# Patient Record
Sex: MALE | Race: WHITE | Employment: FULL TIME | URBAN - METROPOLITAN AREA
[De-identification: names, ages, dates, MRNs, and addresses within clinical notes are randomized per-mention and may not be internally consistent; named-entity substitution may affect disease eponyms.]

---

## 2022-03-11 ENCOUNTER — HOSPITAL ENCOUNTER (OUTPATIENT)
Dept: WOUND CARE | Age: 62
Discharge: HOME | End: 2022-03-11

## 2022-03-11 VITALS
TEMPERATURE: 97.9 F | WEIGHT: 268 LBS | HEIGHT: 73 IN | DIASTOLIC BLOOD PRESSURE: 84 MMHG | HEART RATE: 61 BPM | SYSTOLIC BLOOD PRESSURE: 129 MMHG | BODY MASS INDEX: 35.52 KG/M2 | RESPIRATION RATE: 16 BRPM

## 2022-03-11 RX ORDER — EZETIMIBE 10 MG/1
10 TABLET ORAL DAILY
COMMUNITY

## 2022-03-11 RX ORDER — PRAVASTATIN SODIUM 80 MG/1
80 TABLET ORAL
COMMUNITY

## 2022-03-11 RX ORDER — DOXYCYCLINE 100 MG/1
100 CAPSULE ORAL 2 TIMES DAILY
COMMUNITY
End: 2022-03-23

## 2022-03-11 RX ORDER — GUAIFENESIN 100 MG/5ML
81 LIQUID (ML) ORAL DAILY
COMMUNITY

## 2022-03-15 ENCOUNTER — HOSPITAL ENCOUNTER (OUTPATIENT)
Dept: WOUND CARE | Age: 62
Discharge: HOME | End: 2022-03-15
Payer: COMMERCIAL

## 2022-03-15 VITALS
RESPIRATION RATE: 16 BRPM | TEMPERATURE: 97.8 F | SYSTOLIC BLOOD PRESSURE: 121 MMHG | HEART RATE: 68 BPM | DIASTOLIC BLOOD PRESSURE: 78 MMHG

## 2022-03-15 DIAGNOSIS — L97.112 NON-PRESSURE CHRONIC ULCER OF RIGHT THIGH WITH FAT LAYER EXPOSED (HCC): Primary | ICD-10-CM

## 2022-03-15 PROCEDURE — 11042 DBRDMT SUBQ TIS 1ST 20SQCM/<: CPT

## 2022-03-15 PROCEDURE — 99213 OFFICE O/P EST LOW 20 MIN: CPT

## 2022-03-15 PROCEDURE — 99203 OFFICE O/P NEW LOW 30 MIN: CPT

## 2022-03-15 RX ORDER — LIDOCAINE 40 MG/G
CREAM TOPICAL ONCE
Status: CANCELLED | OUTPATIENT
Start: 2022-03-15 | End: 2022-03-15

## 2022-03-15 RX ORDER — LIDOCAINE 50 MG/G
OINTMENT TOPICAL ONCE
Status: CANCELLED | OUTPATIENT
Start: 2022-03-15 | End: 2022-03-15

## 2022-03-15 RX ORDER — TRIAMCINOLONE ACETONIDE 1 MG/G
OINTMENT TOPICAL ONCE
Status: CANCELLED | OUTPATIENT
Start: 2022-03-15 | End: 2022-03-15

## 2022-03-15 RX ORDER — LIDOCAINE HYDROCHLORIDE 40 MG/ML
SOLUTION TOPICAL ONCE
Status: CANCELLED | OUTPATIENT
Start: 2022-03-15 | End: 2022-03-15

## 2022-03-15 RX ORDER — MUPIROCIN 20 MG/G
OINTMENT TOPICAL ONCE
Status: CANCELLED | OUTPATIENT
Start: 2022-03-15 | End: 2022-03-15

## 2022-03-15 RX ORDER — LIDOCAINE HYDROCHLORIDE 20 MG/ML
JELLY TOPICAL ONCE
Status: CANCELLED | OUTPATIENT
Start: 2022-03-15 | End: 2022-03-15

## 2022-03-15 RX ORDER — CLOBETASOL PROPIONATE 0.5 MG/G
OINTMENT TOPICAL ONCE
Status: CANCELLED | OUTPATIENT
Start: 2022-03-15 | End: 2022-03-15

## 2022-03-15 RX ORDER — BACITRACIN 500 [USP'U]/G
OINTMENT TOPICAL ONCE
Status: CANCELLED | OUTPATIENT
Start: 2022-03-15 | End: 2022-03-15

## 2022-03-15 NOTE — WOUND CARE
03/15/22 1305   Wound Thigh Right;Lateral   Date First Assessed/Time First Assessed: 03/11/22 0829   Present on Hospital Admission: Yes  Wound Approximate Age at First Assessment (Weeks): 2 weeks  Location: Thigh  Wound Location Orientation: Right;Lateral   Wound Image    Wound Etiology Other (Comment)  (infection)   Dressing Status Intact;Breakthrough drainage noted   Cleansed Irrigated with saline   Wound Length (cm) 2.7 cm   Wound Width (cm) 2.7 cm   Wound Depth (cm) 0.5 cm   Wound Surface Area (cm^2) 7.29 cm^2   Change in Wound Size % 0   Wound Volume (cm^3) 3.645 cm^3   Wound Healing % 29   Wound Assessment Fibrin;Slough;Pink/red   Drainage Amount Moderate   Drainage Description Serosanguinous   Wound Odor None   Savanna-Wound/Incision Assessment Intact   Edges Attached edges   Wound Thickness Description Full thickness   Pain 1   Pain Scale 1 Numeric (0 - 10)   Pain Intensity 1 0     Visit Vitals  /78 (BP 1 Location: Left upper arm, BP Patient Position: At rest;Sitting)   Pulse 68   Temp 97.8 °F (36.6 °C)   Resp 16

## 2022-03-15 NOTE — PROGRESS NOTES
Past Medical History:   Diagnosis Date    Anxiety disorder     CAD S/P percutaneous coronary angioplasty KRISTINA to RCA mid    Depression     Gout     MONO (obstructive sleep apnea) s/p uvulectomy    Stroke [facility]) feb 2015       Past Surgical History:   Procedure Laterality Date    HX HEENT  uvulectomy for MONO    HX HEENT  march 2015    tumor removed from nose       Family History   Problem Relation Age of Onset   Tarun Stager Stroke Mother     Heart Disease Mother     Diabetes Mother     Obesity Mother     Cancer Father         inner ear    Cancer Sister         ovarian       Social History     Tobacco Use    Smoking status: Never Smoker    Smokeless tobacco: Never Used   Substance Use Topics    Alcohol use: No     Alcohol/week: 0.0 standard drinks    Drug use: No       No Known Allergies    Current Outpatient Medications on File Prior to Encounter   Medication Sig Dispense Refill    aspirin 81 mg chewable tablet Take 81 mg by mouth daily.  ezetimibe (ZETIA) 10 mg tablet Take 10 mg by mouth daily.  doxycycline (MONODOX) 100 mg capsule Take 100 mg by mouth two (2) times a day.  pravastatin (PRAVACHOL) 80 mg tablet Take 80 mg by mouth nightly.  multivitamin (ONE A DAY) tablet Take 1 Tab by mouth daily. No current facility-administered medications on file prior to encounter.        No components found for: LABA1C    Wound Thigh Right;Lateral (Active)   Wound Image   03/15/22 1305   Wound Etiology Other (Comment) 03/15/22 1305   Dressing Status Intact;Breakthrough drainage noted 03/15/22 1305   Cleansed Irrigated with saline 03/15/22 1305   Wound Length (cm) 2.7 cm 03/15/22 1305   Wound Width (cm) 2.7 cm 03/15/22 1305   Wound Depth (cm) 0.5 cm 03/15/22 1305   Wound Surface Area (cm^2) 7.29 cm^2 03/15/22 1305   Change in Wound Size % 0 03/15/22 1305   Wound Volume (cm^3) 3.645 cm^3 03/15/22 1305   Wound Healing % 29 03/15/22 1305   Wound Assessment Fibrin;Slough;Pink/red 03/15/22 1305 Drainage Amount Moderate 03/15/22 1305   Drainage Description Serosanguinous 03/15/22 1305   Wound Odor None 03/15/22 1305   Savanna-Wound/Incision Assessment Intact 03/15/22 1305   Edges Attached edges 03/15/22 1305   Wound Thickness Description Full thickness 03/15/22 1305   Number of days: 4       . Chief Complaint (CC): chronic non healing wound R thigh. Present Illness (PI): patient 3 weeks ago developed a boil R thigh it was opened in urgent care 3 days later, packed regularly and treated with PO antibiotics. Its better but not healing . Pertinent Past History (PMedHx): cardiac stent, frontal optic nerve tumor removed, TIA. Also noted:                         Medications and Allergies: as per [facility]. I have reviewed and concur. Illnesses: as per '[facility]' recorded data noted today. Surgeries and Injuries: as per '[address] [place] [address]' recorded data noted today. .    Review of Systems (ROS):                        Integumentary: Other than as noted in 'PI'; skin hair and nails normal for age, with no new rash, lumps, bumps, eruptions or bleeding. Lymph: no new prominent nodes or drainage near lymph nodes. Bones, Joints, and Muscles: Other than as noted in 'PI' no new fractures, dislocations, weakness or pain. .                        Hematopoietic: no new bleeding or bruising or anemia changes. .                        Eyes: no recent trauma or inflammation. has. Eye glasses. no. Intra Occular Lens Implants (IOLI)                        Ears: Hearing is unchanged and usually good. Nose: no new drainage, rhinorhea or epistaxis. Mouth, and throat: no soreness, drainage or lesions. no. Dentures. Neck: no new masses, drainage or pain                        Respiratory; no hemoptysis, current shortness of breath or pain with breath. Cardiovascular: No chest pain, palpitation or tachycardia. .                        Gastrointestinal: no recent change in appetite, stools or food tolerance. No jaundice, hematemesis, vomiting or diarrhea                        Genito-Urinary: urine color, frequency, sensation unchanged                        Neurologic: no syncope, dizzyness or unusual sensations. Psychologic and Mental Status: no change in mood, sleep or memory recently     Social History: '[employer]' data today is noted. Works as , non smoker. Family History: '[employer]' data today is noted. [facility] Physical Exam:      General:  alert, NAD high BMI. Head, Eyes, Ears, Nose and Throat: normocephalic, PERRLA EOMI, Tms mucosa benign. Neck: supple without masses or adenopathy. No mass. Chest: full excursion without deformity, . Lungs: rales L only. Heart: RSR no M. Abdomen: soft round non tender, . Neurology: Cranial nerves 2-12 grossly intact . Reflexes: BJ, CBJ, KJ, AJ 2+ and =.  Vascular:                         Pulses:                                            R                    L                                               Radial                        +.                 +.                                              Femoral                     +.                 +.                                              DP                             -. -.                                              PT                             -.                 -.  Extremities: supple non edema. Anterio-lateral R thigh with pale non blanching irregular surface, thin watery drainage and pink surround  Other: [facility] Vital signs and data recorded in '[employer]' for this patient today is noted, reviewed and considered. Patient notes today: no pain today. Procedure Note    PreOp diagnosis: abscess with necrotic surface. .  Name of Procedure: sharp excisional debridement. Anaesthesia: Lidocaine; topical   Description: using sharp scissors I excised all non viable tissue to effect a clean bleeding base. Tissue level of debridement: fat. Post debridement dimensions changed as noted:    Depth, add 1.0 mm;    Width, add 0.0 mm   Length, add 0.0 mm. Blood Loss: 3. CCs. Bleeding abated post treatment . Post Op Diagnosis, and condition: drained abscess probably now minimal bacteria. Follow Up Plan: RTC 2 week baby shampoo clean, aquacel and cover daily. Specimens: C&S. Counseled regarding/Discussed: as above. Clinical considerations: alert to infection, avoid trauma, soil. Prognosis: eval at 2 week, s. Dx Codes: O78.895.

## 2022-03-15 NOTE — DISCHARGE INSTRUCTIONS
Discharge Instructions/Wound Orders  [place] [address] [address] [facility], [address]  Telephone: [phone]    NAME:  Krystina Prakash  YOB: 1960  MEDICAL RECORD NUMBER:  [id number]  DATE:  3/15/2022  Wound Care Orders:  Right leg wound -  Cleanse with baby shampoo, let lather sit for 2-3 minutes, rinse off and pat dry. Place aquacel Ag to wound bed. Cover with silicone foam border. Change every  other day. Dietary:  [x] Diet as tolerated: [] No Added Salt:     [x] Increase Protein:     [] Other:Limit the amount of liquid you are drinking and avoid drinking in between meals   Activity:  [x] Activity as tolerated:  [] Patient has no activity restrictions     [] Strict Bedrest: [] Remain off Work:     [] May return to full duty work:                                   [] Return to work with restrictions:           Return Appointment:  [x] Return Appointment: With Dr Alexandro Alegria in 2 weeks. [] Ordered tests:    Electronically signed Fela Sheppard RN on 3/15/2022 at 1:32 PM     Cecilia Johnson 281: Should you experience any significant changes in your wound(s) or have questions about your wound care, please contact the [address] [place] [address] at [address] [place] [address] 8:00 am - 4:30. If you need help with your wound outside these hours and cannot wait until we are again available, contact your PCP or go to the hospital emergency room. PLEASE NOTE: IF YOU ARE UNABLE TO OBTAIN WOUND SUPPLIES, CONTINUE TO USE THE SUPPLIES YOU HAVE AVAILABLE UNTIL YOU ARE ABLE TO REACH US. IT IS MOST IMPORTANT TO KEEP THE WOUND COVERED AT ALL TIMES.      Physician Signature:_______________________    Date: ___________ Time:  ____________

## 2022-03-23 ENCOUNTER — HOSPITAL ENCOUNTER (OUTPATIENT)
Dept: INFUSION THERAPY | Age: 62
Discharge: HOME | End: 2022-03-23
Payer: COMMERCIAL

## 2022-03-23 VITALS
BODY MASS INDEX: 36.45 KG/M2 | TEMPERATURE: 98.1 F | WEIGHT: 275 LBS | HEIGHT: 73 IN | DIASTOLIC BLOOD PRESSURE: 74 MMHG | OXYGEN SATURATION: 96 % | RESPIRATION RATE: 18 BRPM | HEART RATE: 62 BPM | SYSTOLIC BLOOD PRESSURE: 116 MMHG

## 2022-03-23 LAB
ANION GAP SERPL CALC-SCNC: 8 MMOL/L (ref 5–15)
BUN SERPL-MCNC: 14 MG/DL (ref 6–20)
BUN/CREAT SERPL: 16 (ref 12–20)
CALCIUM SERPL-MCNC: 8.8 MG/DL (ref 8.5–10.1)
CHLORIDE SERPL-SCNC: 106 MMOL/L (ref 97–108)
CO2 SERPL-SCNC: 27 MMOL/L (ref 21–32)
CREAT SERPL-MCNC: 0.88 MG/DL (ref 0.7–1.3)
GLUCOSE SERPL-MCNC: 102 MG/DL (ref 65–100)
POTASSIUM SERPL-SCNC: 4.4 MMOL/L (ref 3.5–5.1)
SODIUM SERPL-SCNC: 141 MMOL/L (ref 136–145)

## 2022-03-23 PROCEDURE — 36415 COLL VENOUS BLD VENIPUNCTURE: CPT

## 2022-03-23 PROCEDURE — 74011000258 HC RX REV CODE- 258: Performed by: EMERGENCY MEDICINE

## 2022-03-23 PROCEDURE — 96365 THER/PROPH/DIAG IV INF INIT: CPT

## 2022-03-23 PROCEDURE — 80048 BASIC METABOLIC PNL TOTAL CA: CPT

## 2022-03-23 PROCEDURE — 74011250636 HC RX REV CODE- 250/636: Performed by: EMERGENCY MEDICINE

## 2022-03-23 RX ADMIN — DEXTROSE MONOHYDRATE 1500 MG: 50 INJECTION, SOLUTION INTRAVENOUS at 10:17

## 2022-03-23 NOTE — PROGRESS NOTES
\[facility]\"" Progress Note  Date: March 23, 2022       Treatment: [place]      Mr. Twyla Faria arrived ambulatory and in no acute distress at 0850 accompanied by wife. Patient COVID Screening completed:   Do you have any symptoms of COVID-19? SOB, coughing, fever, or generally not feeling well? NO   Have you been exposed to COVID-19 recently? NO   Have you had any recent contact with family/friend that has a pending COVID test? NO    Assessment was unremarkable with the exception of minor itching at site of wound. No other concerns voiced. 24G PIV established in R A/C with positive blood return noted. Problem: Knowledge Deficit  Goal: *Verbalizes understanding of procedures and medications  Outcome: Progressing Towards Goal     Problem: Patient Education:  Go to Education Activity  Goal: Patient/Family Education  Outcome: Progressing Towards Goal    Mr. John's vitals for today's visit. Patient Vitals for the past 12 hrs:   Temp Pulse Resp BP SpO2   03/23/22 1049  62  116/74    03/23/22 0855 98.1 °F (36.7 °C) 65 18 118/74 96 %         Lab results:  Recent Results (from the past 12 hour(s))   METABOLIC PANEL, BASIC    Collection Time: 03/23/22  9:07 AM   Result Value Ref Range    Sodium 141 136 - 145 mmol/L    Potassium 4.4 3.5 - 5.1 mmol/L    Chloride 106 97 - 108 mmol/L    CO2 27 21 - 32 mmol/L    Anion gap 8 5 - 15 mmol/L    Glucose 102 (H) 65 - 100 mg/dL    BUN 14 6 - 20 MG/DL    Creatinine 0.88 0.70 - 1.30 MG/DL    BUN/Creatinine ratio 16 12 - 20      GFR est AA >60 >60 ml/min/1.73m2    GFR est non-AA >60 >60 ml/min/1.73m2    Calcium 8.8 8.5 - 10.1 MG/DL       Medications given:  Medications Administered     dalbavancin (DALVANCE) 1,500 mg in dextrose 5% 250 mL, overfill volume 25 mL IVPB     Admin Date  03/23/2022 Action  New Bag Dose  1,500 mg Rate  700 mL/hr Route  IntraVENous Administered By  Joe Cerda, RN                Mr. Twyla Faria tolerated the treatment without complaints.  PIV flushed and removed, 2x2 and coban placed. Mr. Harkins  was discharged from [place] [address] in stable condition at 1055.      Future Appointments   Date Time Provider Johnnie Marquezi   3/29/2022  1:15 PM Trev Jones MD [id number] RN  2022  9:21 AM

## 2022-03-23 NOTE — DISCHARGE INSTRUCTIONS
Patient Education   Dalbavancin (By injection)   Dalbavancin ([id number]-[id number])  Treats skin infections. Brand Name(s): Dalvance   There may be other brand names for this medicine. When This Medicine Should Not Be Used: This medicine is not right for everyone. You should not receive this medicine if you had an allergic reaction to dalbavancin. How to Use This Medicine:   Injectable  · Your doctor will prescribe your dose and schedule. This medicine is given through a needle placed in a vein. · A nurse or other health provider will give you this medicine. Drugs and Foods to Avoid:      Ask your doctor or pharmacist before using any other medicine, including over-the-counter medicines, vitamins, and herbal products. Warnings While Using This Medicine:   · Tell your doctor if you are pregnant or breastfeeding, or if you have kidney disease, liver disease, or if you have had an allergy to glycopeptides. · This medicine may cause the following problems:  ¨ Serious skin reactions  ¨ Infusion-related reactions  ¨ Liver problems  · This medicine can cause diarrhea. Call your doctor if the diarrhea becomes severe, does not stop, or is bloody. Do not take any medicine to stop diarrhea until you have talked to your doctor. Diarrhea can occur 2 months or more after you stop taking this medicine. It may occur 2 months or more after you stop using this medicine. · Your doctor will do lab tests at regular visits to check on the effects of this medicine. Keep all appointments.   Possible Side Effects While Using This Medicine:   Call your doctor right away if you notice any of these side effects:  · Allergic reaction: Itching or hives, swelling in your face or hands, swelling or tingling in your mouth or throat, chest tightness, trouble breathing  · Dark urine or pale stools, nausea, vomiting, loss of appetite, stomach pain, yellow skin or eyes  · Severe diarrhea  · Warmth or redness in your face, neck, arms, or upper body  If you notice these less serious side effects, talk with your doctor:   · Mild diarrhea, nausea  If you notice other side effects that you think are caused by this medicine, tell your doctor. Call your doctor for medical advice about side effects. You may report side effects to FDA at 6-144-MVG-7024  © 2017 [facility] Information is for End User's use only and may not be sold, redistributed or otherwise used for commercial purposes. The above information is an  only. It is not intended as medical advice for individual conditions or treatments. Talk to your doctor, nurse or pharmacist before following any medical regimen to see if it is safe and effective for you.

## 2022-03-24 PROBLEM — L97.112 NON-PRESSURE CHRONIC ULCER OF RIGHT THIGH WITH FAT LAYER EXPOSED (HCC): Status: ACTIVE | Noted: 2022-03-15

## 2022-03-29 ENCOUNTER — HOSPITAL ENCOUNTER (OUTPATIENT)
Dept: WOUND CARE | Age: 62
Discharge: HOME | End: 2022-03-29
Admitting: EMERGENCY MEDICINE
Payer: COMMERCIAL

## 2022-03-29 VITALS
DIASTOLIC BLOOD PRESSURE: 80 MMHG | HEART RATE: 69 BPM | TEMPERATURE: 97.9 F | SYSTOLIC BLOOD PRESSURE: 120 MMHG | RESPIRATION RATE: 17 BRPM

## 2022-03-29 DIAGNOSIS — L97.112 NON-PRESSURE CHRONIC ULCER OF RIGHT THIGH WITH FAT LAYER EXPOSED (HCC): Primary | ICD-10-CM

## 2022-03-29 PROCEDURE — 11042 DBRDMT SUBQ TIS 1ST 20SQCM/<: CPT

## 2022-03-29 RX ORDER — LIDOCAINE 40 MG/G
CREAM TOPICAL ONCE
Status: CANCELLED | OUTPATIENT
Start: 2022-03-29 | End: 2022-03-29

## 2022-03-29 RX ORDER — LIDOCAINE HYDROCHLORIDE 20 MG/ML
JELLY TOPICAL ONCE
Status: CANCELLED | OUTPATIENT
Start: 2022-03-29 | End: 2022-03-29

## 2022-03-29 RX ORDER — CLOBETASOL PROPIONATE 0.5 MG/G
OINTMENT TOPICAL ONCE
Status: CANCELLED | OUTPATIENT
Start: 2022-03-29 | End: 2022-03-29

## 2022-03-29 RX ORDER — TRIAMCINOLONE ACETONIDE 1 MG/G
OINTMENT TOPICAL ONCE
Status: CANCELLED | OUTPATIENT
Start: 2022-03-29 | End: 2022-03-29

## 2022-03-29 RX ORDER — MUPIROCIN 20 MG/G
OINTMENT TOPICAL ONCE
Status: CANCELLED | OUTPATIENT
Start: 2022-03-29 | End: 2022-03-29

## 2022-03-29 RX ORDER — LIDOCAINE 50 MG/G
OINTMENT TOPICAL ONCE
Status: CANCELLED | OUTPATIENT
Start: 2022-03-29 | End: 2022-03-29

## 2022-03-29 RX ORDER — BACITRACIN 500 [USP'U]/G
OINTMENT TOPICAL ONCE
Status: CANCELLED | OUTPATIENT
Start: 2022-03-29 | End: 2022-03-29

## 2022-03-29 RX ORDER — LIDOCAINE HYDROCHLORIDE 40 MG/ML
SOLUTION TOPICAL ONCE
Status: CANCELLED | OUTPATIENT
Start: 2022-03-29 | End: 2022-03-29

## 2022-03-29 NOTE — DISCHARGE INSTRUCTIONS
Discharge Instructions/Wound Orders  [place] [address] [facility], [address]  Telephone: [phone]    NAME:  Lexis De Leon  YOB: 1960  MEDICAL RECORD NUMBER:  [id number]  DATE:  3/29/2022  Wound Care Orders:  Right leg wound -   Cleanse with baby shampoo, let lather sit for 2-3 minutes, rinse off and pat dry. Place xeroform to wound bed. Cover with silicone foam border. Change every 2-3 days. Dietary:  [x] Diet as tolerated: [] Calorie Diabetic Diet:Low carb and no Sugar [] No Added Salt:[x] Increase Protein: [] Other:Limit the amount of liquid you are drinking and avoid drinking in between meals   Activity:  [x] Activity as tolerated:  [] Patient has no activity restrictions     [] Strict Bedrest: [] Remain off Work:     [] May return to full duty work:                                   [] Return to work with restrictions:           Return Appointment:  [x] Return Appointment: With Dr Jesi Roblero in 2 weeks. [] Ordered tests:    Electronically signed Agata Diaz RN on 3/29/2022 at 1:25 PM     Cecilia Johnson 281: Should you experience any significant changes in your wound(s) or have questions about your wound care, please contact the [address] [place] [address] at [address] [place] [address] 8:00 am - 4:30. If you need help with your wound outside these hours and cannot wait until we are again available, contact your PCP or go to the hospital emergency room. PLEASE NOTE: IF YOU ARE UNABLE TO OBTAIN WOUND SUPPLIES, CONTINUE TO USE THE SUPPLIES YOU HAVE AVAILABLE UNTIL YOU ARE ABLE TO REACH US. IT IS MOST IMPORTANT TO KEEP THE WOUND COVERED AT ALL TIMES.      Physician Signature:_______________________    Date: ___________ Time:  ____________

## 2022-03-29 NOTE — PROGRESS NOTES
Wound Thigh Right;Lateral (Active)   Wound Image   03/29/22 1313   Wound Etiology Other (Comment) 03/29/22 1313   Dressing Status Intact;Breakthrough drainage noted 03/29/22 1313   Cleansed Cleansed with saline 03/29/22 1313   Wound Length (cm) 1.7 cm 03/29/22 1313   Wound Width (cm) 2 cm 03/29/22 1313   Wound Depth (cm) 0.1 cm 03/29/22 1313   Wound Surface Area (cm^2) 3.4 cm^2 03/29/22 1313   Change in Wound Size % 53.36 03/29/22 1313   Wound Volume (cm^3) 0.34 cm^3 03/29/22 1313   Wound Healing % 93 03/29/22 1313   Wound Assessment Granulation tissue;Pink/red 03/29/22 1313   Drainage Amount Moderate 03/29/22 1313   Drainage Description Serosanguinous 03/29/22 1313   Wound Odor None 03/29/22 1313   Savanna-Wound/Incision Assessment Intact 03/29/22 1313   Edges Attached edges 03/29/22 1313   Wound Thickness Description Full thickness 03/29/22 1313   Number of days: 18       .       I have noted, and reviewed today's data for this patient in [facility] and concur with same. The focused physical exam, other physical findings, Medical history, Review of Symptoms and Medications today remains unchanged except as noted below. Patient notes today: much better after the Dalvancin. Lesion/Wound, focused exam on Presentation today: R thigh ulcer now granulated with good new skin growth. Slough over all open. Procedure:   Wound # R thigh. Procedure name: sharp excisional debridement. Anaesthesia: Lidocaine; topical    Description: using a sharp curette I excised all non viable tissue to effect a clean bleeding base. Tissue Level/depth of debridement: subQ. Post debridement dimensions changed as noted:    Depth, add 1.0 mm;    Width, add 0.0 mm   Length, add 0.0 mm. Blood Loss: 2 CCs. Bleeding abated post treatment . Post Procedure Condition/ Diagnosis: good progress,MRSA probable controlled. Follow up and Future plans today: RTC 3 week, to xeroform. Specimens: 0.   Patient Counseled regarding/Discussed: as above, good progress. Clinical Considerations: alert to infection, avoid trauma,. Ok to shower. Dx Codes: Q17.023.

## 2022-03-29 NOTE — WOUND CARE
03/29/22 1313   Wound Thigh Right;Lateral   Date First Assessed/Time First Assessed: 03/11/22 0829   Present on Hospital Admission: Yes  Wound Approximate Age at First Assessment (Weeks): 2 weeks  Location: Thigh  Wound Location Orientation: Right;Lateral   Wound Image    Wound Etiology Other (Comment)  (infection)   Dressing Status Intact;Breakthrough drainage noted   Cleansed Cleansed with saline   Wound Length (cm) 1.7 cm   Wound Width (cm) 2 cm   Wound Depth (cm) 0.1 cm   Wound Surface Area (cm^2) 3.4 cm^2   Change in Wound Size % 53.36   Wound Volume (cm^3) 0.34 cm^3   Wound Healing % 93   Wound Assessment Granulation tissue;Pink/red   Drainage Amount Moderate   Drainage Description Serosanguinous   Wound Odor None   Savanna-Wound/Incision Assessment Intact   Edges Attached edges   Wound Thickness Description Full thickness     Visit Vitals  /80 (BP 1 Location: Left upper arm)   Pulse 69   Temp 97.9 °F (36.6 °C)   Resp 17

## 2022-04-12 ENCOUNTER — HOSPITAL ENCOUNTER (OUTPATIENT)
Dept: WOUND CARE | Age: 62
Discharge: HOME | End: 2022-04-12
Admitting: EMERGENCY MEDICINE
Payer: COMMERCIAL

## 2022-04-12 VITALS
DIASTOLIC BLOOD PRESSURE: 74 MMHG | SYSTOLIC BLOOD PRESSURE: 134 MMHG | RESPIRATION RATE: 16 BRPM | TEMPERATURE: 97.8 F | HEART RATE: 74 BPM

## 2022-04-12 DIAGNOSIS — L97.112 NON-PRESSURE CHRONIC ULCER OF RIGHT THIGH WITH FAT LAYER EXPOSED (HCC): Primary | ICD-10-CM

## 2022-04-12 PROCEDURE — 17250 CHEM CAUT OF GRANLTJ TISSUE: CPT

## 2022-04-12 RX ORDER — LIDOCAINE 40 MG/G
CREAM TOPICAL ONCE
Status: CANCELLED | OUTPATIENT
Start: 2022-04-12 | End: 2022-04-12

## 2022-04-12 RX ORDER — TRIAMCINOLONE ACETONIDE 1 MG/G
OINTMENT TOPICAL ONCE
Status: CANCELLED | OUTPATIENT
Start: 2022-04-12 | End: 2022-04-12

## 2022-04-12 RX ORDER — MUPIROCIN 20 MG/G
OINTMENT TOPICAL ONCE
Status: CANCELLED | OUTPATIENT
Start: 2022-04-12 | End: 2022-04-12

## 2022-04-12 RX ORDER — LIDOCAINE 50 MG/G
OINTMENT TOPICAL ONCE
Status: CANCELLED | OUTPATIENT
Start: 2022-04-12 | End: 2022-04-12

## 2022-04-12 RX ORDER — LIDOCAINE HYDROCHLORIDE 20 MG/ML
JELLY TOPICAL ONCE
Status: CANCELLED | OUTPATIENT
Start: 2022-04-12 | End: 2022-04-12

## 2022-04-12 RX ORDER — CLOBETASOL PROPIONATE 0.5 MG/G
OINTMENT TOPICAL ONCE
Status: CANCELLED | OUTPATIENT
Start: 2022-04-12 | End: 2022-04-12

## 2022-04-12 RX ORDER — BACITRACIN 500 [USP'U]/G
OINTMENT TOPICAL ONCE
Status: CANCELLED | OUTPATIENT
Start: 2022-04-12 | End: 2022-04-12

## 2022-04-12 RX ORDER — LIDOCAINE HYDROCHLORIDE 40 MG/ML
SOLUTION TOPICAL ONCE
Status: CANCELLED | OUTPATIENT
Start: 2022-04-12 | End: 2022-04-12

## 2022-04-12 NOTE — DISCHARGE INSTRUCTIONS
Discharge Instructions/Wound Orders  [address], [address]  Telephone: [phone]    NAME:  Miranda Ruelas  YOB: 1960  MEDICAL RECORD NUMBER:  [id number]  DATE:  4/12/2022  Wound Care Orders:  Right leg wound -   Cleanse with baby shampoo, let lather sit for 2-3 minutes, rinse off and pat dry. Place adaptic touch to wound bed. Cover with silicone foam border. Change  every 2-3 days. Dietary:  [x] Diet as tolerated: [] Calorie Diabetic Diet:Low carb and no Sugar [] No Added Salt:[] Increase Protein: [] Other:Limit the amount of liquid you are drinking and avoid drinking in between meals   Activity:  [x] Activity as tolerated:  [] Patient has no activity restrictions     [] Strict Bedrest: [] Remain off Work:     [] May return to full duty work:                                   [] Return to work with restrictions:           Return Appointment:  [x] Return Appointment: With Dr Primitivo Keita in 3 weeks. [] Ordered tests:    Electronically signed Martha Ugalde RN on 4/12/2022 at 1:13 PM     Cecilia Johnson 281: Should you experience any significant changes in your wound(s) or have questions about your wound care, please contact the [address] [place] [address] at [address] [place] [address] 8:00 am - 4:30. If you need help with your wound outside these hours and cannot wait until we are again available, contact your PCP or go to the hospital emergency room. PLEASE NOTE: IF YOU ARE UNABLE TO OBTAIN WOUND SUPPLIES, CONTINUE TO USE THE SUPPLIES YOU HAVE AVAILABLE UNTIL YOU ARE ABLE TO REACH US. IT IS MOST IMPORTANT TO KEEP THE WOUND COVERED AT ALL TIMES.      Physician Signature:_______________________    Date: ___________ Time:  ____________ 10-Dec-2017 15:22 11-Dec-2017 13:47 09-Dec-2017 15:32

## 2022-04-12 NOTE — WOUND CARE
04/12/22 1451   Wound Thigh Right;Lateral   Date First Assessed/Time First Assessed: 03/11/22 0829   Present on Hospital Admission: Yes  Wound Approximate Age at First Assessment (Weeks): 2 weeks  Location: Thigh  Wound Location Orientation: Right;Lateral   Dressing/Treatment   (adaptic touch, foam border)

## 2022-04-12 NOTE — WOUND CARE
04/12/22 1303   Wound Thigh Right;Lateral   Date First Assessed/Time First Assessed: 03/11/22 0829   Present on Hospital Admission: Yes  Wound Approximate Age at First Assessment (Weeks): 2 weeks  Location: Thigh  Wound Location Orientation: Right;Lateral   Wound Image    Dressing Status Intact   Cleansed Cleansed with saline   Wound Length (cm) 2 cm   Wound Width (cm) 1.4 cm   Wound Depth (cm) 0.1 cm   Wound Surface Area (cm^2) 2.8 cm^2   Change in Wound Size % 61.59   Wound Volume (cm^3) 0.28 cm^3   Wound Healing % 95   Wound Assessment Pink/red   Drainage Amount Moderate   Drainage Description Serosanguinous   Wound Odor None   Savanna-Wound/Incision Assessment Intact   Edges Defined edges   Wound Thickness Description Full thickness     Visit Vitals  /74 (BP 1 Location: Left upper arm, BP Patient Position: At rest)   Pulse 74   Temp 97.8 °F (36.6 °C)   Resp 16

## 2022-04-12 NOTE — PROGRESS NOTES
Wound Thigh Right;Lateral (Active)   Wound Image   04/12/22 1303   Wound Etiology Other (Comment) 03/29/22 1313   Dressing Status Intact 04/12/22 1303   Cleansed Cleansed with saline 04/12/22 1303   Wound Length (cm) 2 cm 04/12/22 1303   Wound Width (cm) 1.4 cm 04/12/22 1303   Wound Depth (cm) 0.1 cm 04/12/22 1303   Wound Surface Area (cm^2) 2.8 cm^2 04/12/22 1303   Change in Wound Size % 61.59 04/12/22 1303   Wound Volume (cm^3) 0.28 cm^3 04/12/22 1303   Wound Healing % 95 04/12/22 1303   Wound Assessment Pink/red 04/12/22 1303   Drainage Amount Moderate 04/12/22 1303   Drainage Description Serosanguinous 04/12/22 1303   Wound Odor None 04/12/22 1303   Savanna-Wound/Incision Assessment Intact 04/12/22 1303   Edges Defined edges 04/12/22 1303   Wound Thickness Description Full thickness 04/12/22 1303   Number of days: 32       .       I have noted, and reviewed today's data for this patient in [address] [facility] and concur with same. The focused physical exam, other physical findings, Medical history, Review of Symptoms and Medications today remains unchanged except as noted below. Patient notes today: doing ok, some bleeding of wound recently. Lesion/Wound, focused exam on Presentation today: R thigh ulcer now with hypetrophic granulation, good skin ingrowth, .    Procedure:   Wound # R thigh. Procedure name: cautery. Anaesthesia: Lidocaine; topical    Description: after cleaning wound surface I cauterized granulation with Agn03. Tissue Level/depth of debridement: dermis. Post debridement dimensions changed as noted:    Depth, add 1.0 mm;    Width, add 0.0 mm   Length, add 0.0 mm. Blood Loss: 2 CCs. Bleeding abated post treatment . Post Procedure Condition/ Diagnosis: very good progress. Follow up and Future plans today: RTC 3 weeks continue care with adaptic. Specimens: 0. Patient Counseled regarding/Discussed: as above, good progress. Clinical Considerations: alert to avoid shear, infection.     Dx Codes: Z88.141.

## 2022-05-03 ENCOUNTER — HOSPITAL ENCOUNTER (OUTPATIENT)
Dept: WOUND CARE | Age: 62
Discharge: HOME | End: 2022-05-03
Payer: COMMERCIAL

## 2022-05-03 VITALS
DIASTOLIC BLOOD PRESSURE: 80 MMHG | RESPIRATION RATE: 16 BRPM | HEART RATE: 79 BPM | SYSTOLIC BLOOD PRESSURE: 131 MMHG | TEMPERATURE: 98 F

## 2022-05-03 DIAGNOSIS — L97.112 NON-PRESSURE CHRONIC ULCER OF RIGHT THIGH WITH FAT LAYER EXPOSED (HCC): Primary | ICD-10-CM

## 2022-05-03 PROCEDURE — 99213 OFFICE O/P EST LOW 20 MIN: CPT

## 2022-05-03 RX ORDER — LIDOCAINE HYDROCHLORIDE 20 MG/ML
JELLY TOPICAL ONCE
OUTPATIENT
Start: 2022-05-03 | End: 2022-05-03

## 2022-05-03 RX ORDER — LIDOCAINE 50 MG/G
OINTMENT TOPICAL ONCE
OUTPATIENT
Start: 2022-05-03 | End: 2022-05-03

## 2022-05-03 RX ORDER — TRIAMCINOLONE ACETONIDE 1 MG/G
OINTMENT TOPICAL ONCE
OUTPATIENT
Start: 2022-05-03 | End: 2022-05-03

## 2022-05-03 RX ORDER — MUPIROCIN 20 MG/G
OINTMENT TOPICAL ONCE
OUTPATIENT
Start: 2022-05-03 | End: 2022-05-03

## 2022-05-03 RX ORDER — LIDOCAINE 40 MG/G
CREAM TOPICAL ONCE
OUTPATIENT
Start: 2022-05-03 | End: 2022-05-03

## 2022-05-03 RX ORDER — LIDOCAINE HYDROCHLORIDE 40 MG/ML
SOLUTION TOPICAL ONCE
OUTPATIENT
Start: 2022-05-03 | End: 2022-05-03

## 2022-05-03 RX ORDER — BACITRACIN 500 [USP'U]/G
OINTMENT TOPICAL ONCE
OUTPATIENT
Start: 2022-05-03 | End: 2022-05-03

## 2022-05-03 RX ORDER — CLOBETASOL PROPIONATE 0.5 MG/G
OINTMENT TOPICAL ONCE
OUTPATIENT
Start: 2022-05-03 | End: 2022-05-03

## 2022-05-03 NOTE — WOUND CARE
05/03/22 1334   Wound Thigh Right;Lateral   Date First Assessed/Time First Assessed: 03/11/22 0853   Present on Hospital Admission: Yes  Wound Approximate Age at First Assessment (Weeks): 2 weeks  Location: Thigh  Wound Location Orientation: Right;Lateral   Cleansed   (adaptic, border foam)

## 2022-05-03 NOTE — PROGRESS NOTES
Wound Thigh Right;Lateral (Active)   Wound Image   05/03/22 1307   Wound Etiology Other (Comment) 03/29/22 1313   Dressing Status Intact 05/03/22 1307   Cleansed Cleansed with saline 05/03/22 1307   Wound Length (cm) 1 cm 05/03/22 1307   Wound Width (cm) 0.8 cm 05/03/22 1307   Wound Depth (cm) 0.1 cm 05/03/22 1307   Wound Surface Area (cm^2) 0.8 cm^2 05/03/22 1307   Change in Wound Size % 89.03 05/03/22 1307   Wound Volume (cm^3) 0.08 cm^3 05/03/22 1307   Wound Healing % 98 05/03/22 1307   Wound Assessment Pink/red 05/03/22 1307   Drainage Amount Scant 05/03/22 1307   Drainage Description Serous 05/03/22 1307   Wound Odor None 05/03/22 1307   Savanna-Wound/Incision Assessment Intact 05/03/22 1307   Edges Defined edges 05/03/22 1307   Wound Thickness Description Full thickness 05/03/22 1307   Number of days: 53       .       I have noted, and reviewed today's data for this patient in [facility] and concur with same. The focused physical exam, other physical findings, Medical history, Review of Symptoms and Medications today remains unchanged except as noted below. Patient notes today: doing better. Lesion/Wound, focused exam on Presentation today: R thigh ulcer very much smaller shallow with thin slough over surface. Procedure:   Wound # R thigh. Procedure name: selective debridement. Anaesthesia: Lidocaine; topical    Description: using only dry gauze I removed slouigh and debris over open ulcer bed to secure clean wound base. Tissue Level/depth of debridement: dermis, subdermis. Post debridement dimensions changed as noted:    Depth, add 1.0 mm;    Width, add 0.0 mm   Length, add 0.0 mm. Blood Loss: 1 CCs. Bleeding abated post treatment . Post Procedure Condition/ Diagnosis: no pain, steady closing. Follow up and Future plans today: RTC 3 weeks continue care reduce treatment when covered with skin but protect from dryness and trauma. Specimens: 0.   Patient Counseled regarding/Discussed: as above.  Clinical Considerations: alert to avoid trauma, dryness, infection. Dx Codes: L97./112.

## 2022-05-03 NOTE — DISCHARGE INSTRUCTIONS
Discharge Instructions/Wound Orders  [place] [address] [facility], [address]  Telephone: [phone]    NAME:  Sharon Davey  YOB: 1960  MEDICAL RECORD NUMBER:  [id number]  DATE:  5/3/2022  Wound Care Orders:  Right leg wound -   Cleanse with baby shampoo, let lather sit for 2-3 minutes, rinse off and pat dry. Place adaptic touch to wound bed. Cover with silicone foam border. Change every 2-3 days. Dietary:  [x] Diet as tolerated: [] Calorie Diabetic Diet:Low carb and no Sugar [] No Added Salt:[] Increase Protein: [] Other:Limit the amount of liquid you are drinking and avoid drinking in between meals   Activity:  [x] Activity as tolerated:  [] Patient has no activity restrictions     [] Strict Bedrest: [] Remain off Work:     [] May return to full duty work:                                   [] Return to work with restrictions:           Return Appointment:  [x] Return Appointment: With Dr Italo Negro in 3 weeks. [] Ordered tests:    Electronically signed Ines Mello RN on 5/3/2022 at [address] [place] [address]: Should you experience any significant changes in your wound(s) or have questions about your wound care, please contact the [address] [place] [address] at [address] [place] [address] 8:00 am - 4:30. If you need help with your wound outside these hours and cannot wait until we are again available, contact your PCP or go to the hospital emergency room. PLEASE NOTE: IF YOU ARE UNABLE TO OBTAIN WOUND SUPPLIES, CONTINUE TO USE THE SUPPLIES YOU HAVE AVAILABLE UNTIL YOU ARE ABLE TO REACH US. IT IS MOST IMPORTANT TO KEEP THE WOUND COVERED AT ALL TIMES.      Physician Signature:_______________________    Date: ___________ Time:  ____________

## 2022-05-03 NOTE — WOUND CARE
05/03/22 1307   Wound Thigh Right;Lateral   Date First Assessed/Time First Assessed: 03/11/22 0829   Present on Hospital Admission: Yes  Wound Approximate Age at First Assessment (Weeks): 2 weeks  Location: Thigh  Wound Location Orientation: Right;Lateral   Wound Image    Dressing Status Intact   Cleansed Cleansed with saline   Wound Length (cm) 1 cm   Wound Width (cm) 0.8 cm   Wound Depth (cm) 0.1 cm   Wound Surface Area (cm^2) 0.8 cm^2   Change in Wound Size % 89.03   Wound Volume (cm^3) 0.08 cm^3   Wound Healing % 98   Wound Assessment Pink/red   Drainage Amount Scant   Drainage Description Serous   Wound Odor None   Savanna-Wound/Incision Assessment Intact   Edges Defined edges   Wound Thickness Description Full thickness   Pain 1   Pain Scale 1 Numeric (0 - 10)   Pain Intensity 1 0   Patient Stated Pain Goal 0     Visit Vitals  /80 (BP 1 Location: Left upper arm, BP Patient Position: At rest)   Pulse 79   Temp 98 °F (36.7 °C)   Resp 16

## 2023-05-05 ENCOUNTER — HOSPITAL ENCOUNTER (EMERGENCY)
Age: 63
Discharge: HOME | End: 2023-05-05
Attending: EMERGENCY MEDICINE
Payer: COMMERCIAL

## 2023-05-05 ENCOUNTER — APPOINTMENT (OUTPATIENT)
Dept: GENERAL RADIOLOGY | Age: 63
End: 2023-05-05
Attending: EMERGENCY MEDICINE
Payer: COMMERCIAL

## 2023-05-05 VITALS
DIASTOLIC BLOOD PRESSURE: 81 MMHG | HEIGHT: 73 IN | HEART RATE: 72 BPM | OXYGEN SATURATION: 96 % | BODY MASS INDEX: 34.46 KG/M2 | SYSTOLIC BLOOD PRESSURE: 144 MMHG | TEMPERATURE: 97.7 F | RESPIRATION RATE: 14 BRPM | WEIGHT: 260 LBS

## 2023-05-05 DIAGNOSIS — R07.89 NON-CARDIAC CHEST PAIN: Primary | ICD-10-CM

## 2023-05-05 LAB
ALBUMIN SERPL-MCNC: 4.1 G/DL (ref 3.5–5)
ALBUMIN/GLOB SERPL: 1.2 (ref 1.1–2.2)
ALP SERPL-CCNC: 63 U/L (ref 45–117)
ALT SERPL-CCNC: 36 U/L (ref 12–78)
ANION GAP SERPL CALC-SCNC: 3 MMOL/L (ref 5–15)
AST SERPL-CCNC: 20 U/L (ref 15–37)
BASOPHILS # BLD: 0 K/UL (ref 0–0.1)
BASOPHILS NFR BLD: 0 % (ref 0–1)
BILIRUB SERPL-MCNC: 0.5 MG/DL (ref 0.2–1)
BNP SERPL-MCNC: 32 PG/ML
BUN SERPL-MCNC: 14 MG/DL (ref 6–20)
BUN/CREAT SERPL: 14 (ref 12–20)
CALCIUM SERPL-MCNC: 9 MG/DL (ref 8.5–10.1)
CHLORIDE SERPL-SCNC: 107 MMOL/L (ref 97–108)
CO2 SERPL-SCNC: 29 MMOL/L (ref 21–32)
CREAT SERPL-MCNC: 1 MG/DL (ref 0.7–1.3)
DIFFERENTIAL METHOD BLD: NORMAL
EOSINOPHIL # BLD: 0.1 K/UL (ref 0–0.4)
EOSINOPHIL NFR BLD: 1 % (ref 0–7)
ERYTHROCYTE [DISTWIDTH] IN BLOOD BY AUTOMATED COUNT: 12.7 % (ref 11.5–14.5)
GLOBULIN SER CALC-MCNC: 3.5 G/DL (ref 2–4)
GLUCOSE SERPL-MCNC: 101 MG/DL (ref 65–100)
HCT VFR BLD AUTO: 47.2 % (ref 36.6–50.3)
HGB BLD-MCNC: 15.8 G/DL (ref 12.1–17)
IMM GRANULOCYTES # BLD AUTO: 0 K/UL (ref 0–0.04)
IMM GRANULOCYTES NFR BLD AUTO: 0 % (ref 0–0.5)
LYMPHOCYTES # BLD: 2.4 K/UL (ref 0.8–3.5)
LYMPHOCYTES NFR BLD: 28 % (ref 12–49)
MCH RBC QN AUTO: 29.4 PG (ref 26–34)
MCHC RBC AUTO-ENTMCNC: 33.5 G/DL (ref 30–36.5)
MCV RBC AUTO: 87.9 FL (ref 80–99)
MONOCYTES # BLD: 0.7 K/UL (ref 0–1)
MONOCYTES NFR BLD: 8 % (ref 5–13)
NEUTS SEG # BLD: 5.3 K/UL (ref 1.8–8)
NEUTS SEG NFR BLD: 63 % (ref 32–75)
NRBC # BLD: 0 K/UL (ref 0–0.01)
NRBC BLD-RTO: 0 PER 100 WBC
PLATELET # BLD AUTO: 235 K/UL (ref 150–400)
PMV BLD AUTO: 8.9 FL (ref 8.9–12.9)
POTASSIUM SERPL-SCNC: 3.8 MMOL/L (ref 3.5–5.1)
PROT SERPL-MCNC: 7.6 G/DL (ref 6.4–8.2)
RBC # BLD AUTO: 5.37 M/UL (ref 4.1–5.7)
SODIUM SERPL-SCNC: 139 MMOL/L (ref 136–145)
TROPONIN I SERPL HS-MCNC: 5 NG/L (ref 0–76)
TROPONIN I SERPL HS-MCNC: 6 NG/L (ref 0–76)
WBC # BLD AUTO: 8.5 K/UL (ref 4.1–11.1)

## 2023-05-05 PROCEDURE — 83880 ASSAY OF NATRIURETIC PEPTIDE: CPT

## 2023-05-05 PROCEDURE — 80053 COMPREHEN METABOLIC PANEL: CPT

## 2023-05-05 PROCEDURE — 84484 ASSAY OF TROPONIN QUANT: CPT

## 2023-05-05 PROCEDURE — 71045 X-RAY EXAM CHEST 1 VIEW: CPT

## 2023-05-05 PROCEDURE — 85025 COMPLETE CBC W/AUTO DIFF WBC: CPT

## 2023-05-05 PROCEDURE — 36415 COLL VENOUS BLD VENIPUNCTURE: CPT

## 2023-05-05 PROCEDURE — 99285 EMERGENCY DEPT VISIT HI MDM: CPT

## 2023-05-06 LAB
ATRIAL RATE: 77 BPM
CALCULATED P AXIS, ECG09: 19 DEGREES
CALCULATED R AXIS, ECG10: -6 DEGREES
CALCULATED T AXIS, ECG11: 17 DEGREES
DIAGNOSIS, 93000: NORMAL
P-R INTERVAL, ECG05: 138 MS
Q-T INTERVAL, ECG07: 402 MS
QRS DURATION, ECG06: 102 MS
QTC CALCULATION (BEZET), ECG08: 454 MS
VENTRICULAR RATE, ECG03: 77 BPM

## 2023-05-23 RX ORDER — PRAVASTATIN SODIUM 80 MG/1
80 TABLET ORAL NIGHTLY
COMMUNITY

## 2023-05-23 RX ORDER — ASPIRIN 81 MG/1
81 TABLET, CHEWABLE ORAL DAILY
COMMUNITY

## 2023-05-23 RX ORDER — EZETIMIBE 10 MG/1
10 TABLET ORAL DAILY
COMMUNITY

## 2025-06-24 ENCOUNTER — HOSPITAL ENCOUNTER (EMERGENCY)
Facility: HOSPITAL | Age: 65
Discharge: HOME | End: 2025-06-24
Attending: STUDENT IN AN ORGANIZED HEALTH CARE EDUCATION/TRAINING PROGRAM
Payer: COMMERCIAL

## 2025-06-24 ENCOUNTER — APPOINTMENT (OUTPATIENT)
Facility: HOSPITAL | Age: 65
End: 2025-06-24
Payer: COMMERCIAL

## 2025-06-24 VITALS
DIASTOLIC BLOOD PRESSURE: 93 MMHG | OXYGEN SATURATION: 98 % | TEMPERATURE: 98.2 F | RESPIRATION RATE: 18 BRPM | WEIGHT: 249.78 LBS | HEART RATE: 86 BPM | SYSTOLIC BLOOD PRESSURE: 132 MMHG | HEIGHT: 73 IN | BODY MASS INDEX: 33.1 KG/M2

## 2025-06-24 DIAGNOSIS — L03.311 CELLULITIS OF ABDOMINAL WALL: Primary | ICD-10-CM

## 2025-06-24 LAB
ALBUMIN SERPL-MCNC: 3.7 G/DL (ref 3.5–5)
ALBUMIN/GLOB SERPL: 1.1 (ref 1.1–2.2)
ALP SERPL-CCNC: 54 U/L (ref 45–117)
ALT SERPL-CCNC: 21 U/L (ref 12–78)
ANION GAP SERPL CALC-SCNC: 5 MMOL/L (ref 2–12)
AST SERPL-CCNC: 18 U/L (ref 15–37)
BASOPHILS # BLD: 0.03 K/UL (ref 0–0.1)
BASOPHILS NFR BLD: 0.4 % (ref 0–1)
BILIRUB SERPL-MCNC: 0.4 MG/DL (ref 0.2–1)
BUN SERPL-MCNC: 18 MG/DL (ref 6–20)
BUN/CREAT SERPL: 17 (ref 12–20)
CALCIUM SERPL-MCNC: 9.3 MG/DL (ref 8.5–10.1)
CHLORIDE SERPL-SCNC: 109 MMOL/L (ref 97–108)
CO2 SERPL-SCNC: 27 MMOL/L (ref 21–32)
CREAT SERPL-MCNC: 1.03 MG/DL (ref 0.7–1.3)
DIFFERENTIAL METHOD BLD: ABNORMAL
EKG ATRIAL RATE: 81 BPM
EKG DIAGNOSIS: NORMAL
EKG P AXIS: 50 DEGREES
EKG P-R INTERVAL: 148 MS
EKG Q-T INTERVAL: 388 MS
EKG QRS DURATION: 102 MS
EKG QTC CALCULATION (BAZETT): 450 MS
EKG R AXIS: 28 DEGREES
EKG T AXIS: 38 DEGREES
EKG VENTRICULAR RATE: 81 BPM
EOSINOPHIL # BLD: 0.06 K/UL (ref 0–0.4)
EOSINOPHIL NFR BLD: 0.7 % (ref 0–7)
ERYTHROCYTE [DISTWIDTH] IN BLOOD BY AUTOMATED COUNT: 12.8 % (ref 11.5–14.5)
GLOBULIN SER CALC-MCNC: 3.5 G/DL (ref 2–4)
GLUCOSE SERPL-MCNC: 101 MG/DL (ref 65–100)
HCT VFR BLD AUTO: 44.5 % (ref 36.6–50.3)
HGB BLD-MCNC: 14.8 G/DL (ref 12.1–17)
IMM GRANULOCYTES # BLD AUTO: 0.02 K/UL (ref 0–0.04)
IMM GRANULOCYTES NFR BLD AUTO: 0.2 % (ref 0–0.5)
LACTATE BLD-SCNC: 0.78 MMOL/L (ref 0.4–2)
LYMPHOCYTES # BLD: 1.35 K/UL (ref 0.8–3.5)
LYMPHOCYTES NFR BLD: 16.4 % (ref 12–49)
MCH RBC QN AUTO: 29.9 PG (ref 26–34)
MCHC RBC AUTO-ENTMCNC: 33.3 G/DL (ref 30–36.5)
MCV RBC AUTO: 89.9 FL (ref 80–99)
MONOCYTES # BLD: 0.53 K/UL (ref 0–1)
MONOCYTES NFR BLD: 6.4 % (ref 5–13)
NEUTS SEG # BLD: 6.23 K/UL (ref 1.8–8)
NEUTS SEG NFR BLD: 75.9 % (ref 32–75)
NRBC # BLD: 0 K/UL (ref 0–0.01)
NRBC BLD-RTO: 0 PER 100 WBC
PLATELET # BLD AUTO: 265 K/UL (ref 150–400)
PMV BLD AUTO: 9.1 FL (ref 8.9–12.9)
POTASSIUM SERPL-SCNC: 4.2 MMOL/L (ref 3.5–5.1)
PROT SERPL-MCNC: 7.2 G/DL (ref 6.4–8.2)
RBC # BLD AUTO: 4.95 M/UL (ref 4.1–5.7)
SODIUM SERPL-SCNC: 141 MMOL/L (ref 136–145)
WBC # BLD AUTO: 8.2 K/UL (ref 4.1–11.1)

## 2025-06-24 PROCEDURE — 83605 ASSAY OF LACTIC ACID: CPT

## 2025-06-24 PROCEDURE — 93005 ELECTROCARDIOGRAM TRACING: CPT | Performed by: STUDENT IN AN ORGANIZED HEALTH CARE EDUCATION/TRAINING PROGRAM

## 2025-06-24 PROCEDURE — 74177 CT ABD & PELVIS W/CONTRAST: CPT

## 2025-06-24 PROCEDURE — 6360000004 HC RX CONTRAST MEDICATION: Performed by: STUDENT IN AN ORGANIZED HEALTH CARE EDUCATION/TRAINING PROGRAM

## 2025-06-24 PROCEDURE — 6370000000 HC RX 637 (ALT 250 FOR IP): Performed by: STUDENT IN AN ORGANIZED HEALTH CARE EDUCATION/TRAINING PROGRAM

## 2025-06-24 PROCEDURE — 99285 EMERGENCY DEPT VISIT HI MDM: CPT

## 2025-06-24 PROCEDURE — 80053 COMPREHEN METABOLIC PANEL: CPT

## 2025-06-24 PROCEDURE — 85025 COMPLETE CBC W/AUTO DIFF WBC: CPT

## 2025-06-24 PROCEDURE — 36415 COLL VENOUS BLD VENIPUNCTURE: CPT

## 2025-06-24 RX ORDER — SULFAMETHOXAZOLE AND TRIMETHOPRIM 800; 160 MG/1; MG/1
1 TABLET ORAL 2 TIMES DAILY
Qty: 14 TABLET | Refills: 0 | Status: SHIPPED | OUTPATIENT
Start: 2025-06-24 | End: 2025-07-01

## 2025-06-24 RX ORDER — SULFAMETHOXAZOLE AND TRIMETHOPRIM 800; 160 MG/1; MG/1
1 TABLET ORAL
Status: COMPLETED | OUTPATIENT
Start: 2025-06-24 | End: 2025-06-24

## 2025-06-24 RX ORDER — CEPHALEXIN 500 MG/1
500 CAPSULE ORAL 4 TIMES DAILY
Qty: 28 CAPSULE | Refills: 0 | Status: SHIPPED | OUTPATIENT
Start: 2025-06-24 | End: 2025-06-24

## 2025-06-24 RX ORDER — IOPAMIDOL 755 MG/ML
100 INJECTION, SOLUTION INTRAVASCULAR
Status: COMPLETED | OUTPATIENT
Start: 2025-06-24 | End: 2025-06-24

## 2025-06-24 RX ORDER — CEPHALEXIN 500 MG/1
500 CAPSULE ORAL 4 TIMES DAILY
Qty: 28 CAPSULE | Refills: 0 | Status: SHIPPED | OUTPATIENT
Start: 2025-06-24 | End: 2025-07-01

## 2025-06-24 RX ORDER — SULFAMETHOXAZOLE AND TRIMETHOPRIM 800; 160 MG/1; MG/1
1 TABLET ORAL 2 TIMES DAILY
Qty: 14 TABLET | Refills: 0 | Status: SHIPPED | OUTPATIENT
Start: 2025-06-24 | End: 2025-06-24

## 2025-06-24 RX ADMIN — SULFAMETHOXAZOLE AND TRIMETHOPRIM 1 TABLET: 800; 160 TABLET ORAL at 16:45

## 2025-06-24 RX ADMIN — CEPHALEXIN 500 MG: 250 CAPSULE ORAL at 16:45

## 2025-06-24 RX ADMIN — IOPAMIDOL 100 ML: 755 INJECTION, SOLUTION INTRAVENOUS at 15:36

## 2025-06-24 ASSESSMENT — PAIN DESCRIPTION - LOCATION: LOCATION: ABDOMEN

## 2025-06-24 ASSESSMENT — PAIN DESCRIPTION - ORIENTATION: ORIENTATION: LEFT;LOWER

## 2025-06-24 ASSESSMENT — PAIN SCALES - GENERAL: PAINLEVEL_OUTOF10: 3

## 2025-06-24 NOTE — ED PROVIDER NOTES
EMERGENCY DEPARTMENT HISTORY AND PHYSICAL EXAM      Date: 6/24/2025  Patient Name: Yevgeniy Hernandes    History of Presenting Illness     Chief Complaint   Patient presents with    Wound Check     Patient ambulatory into triage c/o abscess to left lower abdomen x1 week. Pt notes drainage, redness to area. Denies fever at home but states \"I feel like I need to pass out, I feel strange.\"          HPI: History From: patient, History limited by: none  Yevgeniy Hernandes, 64 y.o. male presents to the ED with cc of abdominal wound.  He has a history of wounds on his right leg, about 2 years ago, and was previously treated for MRSA.  He noted a wound on his left lower abdominal wall that started as a tiny dot, however has gotten bigger over the past 1 week.  He went to his wound care clinic today, however was told they no longer take his insurance, which prompted him to come here to the emergency department instead.  He was out in the heat earlier today cutting grass, and when walking across the parking lot here, he became lightheaded.  That has since resolved.  He denies any new weakness or numbness on one side of the body, no speech or vision changes.  He denies chest pain or shortness of breath.  No fevers, other than abdominal wall wound he feels well.  He reports history of prior stroke with some ongoing struggles with memory, no other residual deficits.  He denies history of diabetes        There are no other complaints, changes, or physical findings at this time.    PCP: Surya Chiang MD    No current facility-administered medications on file prior to encounter.     Current Outpatient Medications on File Prior to Encounter   Medication Sig Dispense Refill    aspirin 81 MG chewable tablet Take 1 tablet by mouth daily      ezetimibe (ZETIA) 10 MG tablet Take 1 tablet by mouth daily      pravastatin (PRAVACHOL) 80 MG tablet Take 1 tablet by mouth nightly         Past History     Past Medical History:  Past Medical History:

## 2025-06-24 NOTE — DISCHARGE INSTRUCTIONS
Local Primary Care Physicians  [facility] / [facility] [phone]  RED Ramirez, MD Naresh Crews, MD Vincenzo Lanier, MD Leni Mata, [facility]  Mandi Crenshaw, [facility]  Halle Katz, [facility] [place] [phone]  Harrison Llanos, MD Rodolfo Sanford, NP  Erma Roosevelt, [facility]  Jennifer Victoria, [facility] [phone]  Laura Thorpe, MD Royce Thorpe, MD Pricilla Benjamin, MD Vivienne Gabriel, [facility] [phone]  MD Sonya Maier, MD Katharine Fuller, MD Malachi Anand, MD Vincenzo Bradley, MD Zulema Ohara, [facility] [phone]  Dante Hussein, MD Vincenzo Yeh, NP  Chris Solares, NP [facility] [phone]  Jose Alejandro \"Maxi\" Girma, MD Yann Styles, MD Reji Mckeon, CNP  Abdiel Simin, CNP  Justine Roca, PA-C   [facility] [phone]  MD Houston Ackerman, MD Shaylee Ribera, CNP  Lyubov Hussain, [facility] [phone]  Nu Drummond, MD Surya Chiang, MD Nelda Rader, MD Patrick Matthew MD Karlie Kellstrom PA-C   [facility] [phone]  MD Quynh Tellez, MD Shaylee Madsen MD Gregory Stephens, MD Aicha Mcgowan, [facility]  Mirela Holcomb, [facility]  Carla Valenzuela, [facility]  Jhonny Bray, [facility] [phone]  MD Sterling Fletcher, MD Toribio Gaspar, MD Kulwinder Travis, MD  Odalis JulioMD Lucero neal MD Jason Lee, MD Jennifer Smith, MD [facility] [phone]  MD Harleen Coe, CNP  Malika Long, CNP  Katharine Ramirez, [facility] [phone]  MD Maxi Khalil MD Veronika Leonenko, PA-C  Iliana Caballero PA-C   [facility] [phone]  [employer], [facility].  Vinicius